# Patient Record
(demographics unavailable — no encounter records)

---

## 2017-12-18 NOTE — RAD
CHEST 1 VIEW:

 

Date:  12/18/17 

 

HISTORY:  

Fever. 

 

COMPARISON:  

None. 

 

FINDINGS:

There are patchy air space opacities in both lower lobes. Heart size is enlarged. No pneumothorax. 

 

No focal osseous abnormality. 

 

IMPRESSION: 

1.  Faint lower lobe air opacities are somewhat streaky and may represent pneumonia in the correct cl
inical setting. Follow-up recommended. 

 

2.  Mild lung hyperinflation suggestive of obstructive pulmonary disease. 

 

 

POS: HOME

## 2017-12-19 NOTE — PDOC.FM
- Subjective


Subjective: 





Pt reports doing a little better this morning. Denies any fever, chills. Denies 

any acute events overnight. Is tolerating PO. Has not gotten up and moved 

around much yet. 





- Objective


MAR Reviewed: Yes


Vital Signs & Weight: 


 Vital Signs (12 hours)











  Temp Pulse Resp BP BP Pulse Ox


 


 12/19/17 08:42  99.0 F  74  18    94 L


 


 12/19/17 08:40  99.0 F  74  18   137/69  96


 


 12/19/17 06:10   66  18  114/53 L   96


 


 12/19/17 05:15   82  20  95/50 L   96


 


 12/19/17 04:01  98.5 F  74  18  104/45 L   95


 


 12/18/17 23:37  97.8 F  65  24 H  112/54 L   91 L








 Weight











Weight                         62.142 kg














I&O: 


 











 12/18/17 12/19/17 12/20/17





 06:59 06:59 06:59


 


Intake Total  1020 400


 


Output Total  50 


 


Balance  970 400











Result Diagrams: 


 12/19/17 03:52





 12/19/17 03:52


Radiology Reviewed by me: Yes


Radiology: 





CXR: 1. faint lower lobe air opacities are somewhat streaky and may represent 

pneumonia in clinical setting. 2. Mild lung hyperinflation suggestive of 

obstructive pulm disease





<Alden Massey - Last Filed: 12/19/17 11:22>





- Objective


Vital Signs & Weight: 


 Vital Signs (12 hours)











  Temp Pulse Resp BP BP Pulse Ox


 


 12/19/17 11:48       92 L


 


 12/19/17 11:32  98.8 F  84  19   101/70  92 L


 


 12/19/17 08:42  99.0 F  74  18    94 L


 


 12/19/17 08:40  99.0 F  74  18   137/69  96


 


 12/19/17 06:10   66  18  114/53 L   96


 


 12/19/17 05:15   82  20  95/50 L   96


 


 12/19/17 04:01  98.5 F  74  18  104/45 L   95








 Weight











Weight                         62.142 kg














I&O: 


 











 12/18/17 12/19/17 12/20/17





 06:59 06:59 06:59


 


Intake Total  1020 400


 


Output Total  50 


 


Balance  970 400











Result Diagrams: 


 12/19/17 03:52





 12/19/17 03:52





<Santiago Martin - Last Filed: 12/19/17 12:48>





Phys Exam





- Physical Examination


HEENT: moist MMs, oral pharynx no lesions


Neck: no nodes, supple, full ROM


prominent crackles noted in LLL. Mild crakles in other lobes


decreased air movement auscultated bilaterally


Cardiovascular: RRR, no significant murmur, no rub


Gastrointestinal: soft, non-tender, no distention, positive bowel sounds


Musculoskeletal: no edema, pulses present


Neurological: non-focal, normal sensation, moves all 4 limbs


Lymphatic: no nodes


Psychiatric: normal affect


Skin: normal turgor


Deviation from normal: Has patch of lichen sclerosis on back. Dry skin. Likely 

from chronic itchin





<Alden Massey - Last Filed: 12/19/17 11:22>





Dx/Plan


(1) Sepsis due to pneumonia


Code(s): J18.9 - PNEUMONIA, UNSPECIFIED ORGANISM; A41.9 - SEPSIS, UNSPECIFIED 

ORGANISM   Status: Resolved   


Plan: 


Had increased hr due to atrial flutter. RR increased initialy. Requiring O2 due 

to desaturation. Source of infection likely pneumonia on CXR. 


Vital have since improved. Lactic acid down from 3.1 to 2. 


Was initially tx with Vancomycin and rocephin. Will continue tx with rocephin. D

/c Vancomycin and will switch to azithromycin. 


Was on IV fluids. Tolerating PO now. Lungs getting crackly. Will hold IV fluids 

for now and encourage PO. Will recheck BMP and CBC tmrw am. 








(2) Community acquired pneumonia


Code(s): J18.9 - PNEUMONIA, UNSPECIFIED ORGANISM   Status: Acute   


Plan: 


Plan as above for sepsis. 


Azithromycin and rocephin for Abx coverage. 


Will continue to wean off oxygen. Does not require at home. 


Blood Cx-NGTD


-Will check Urine Ag for S. pneumo and Legionella 


   Will adjust abx as needed. 


-Walking program ordered for her to help and get up and move around. 








(3) Atrial flutter


Code(s): I48.92 - UNSPECIFIED ATRIAL FLUTTER   Status: Resolved   


Plan: 


-Had short episode of Atrial flutter in ER. Is on metroprolol at home. Had 

missed a few doses of medication at home. Also was septic due to pneumonia 

initially. 


-Atrial flutter has since then resolved after getting fluids and abx. 


-On tele monitoring. Rate and rhythm normal. No new episode since admission. 


-Will continue home metoprolol. 








(4) Hypokalemia


Code(s): E87.6 - HYPOKALEMIA   Status: Acute   


Plan: 


K-3.4 this am. replaced with KCl this morning. will recheck BMP in the am and 

replace as needed. 








(5) HTN (hypertension)


Code(s): I10 - ESSENTIAL (PRIMARY) HYPERTENSION   Status: Acute   


Plan: 


-continue home meds. 








<Alden Massey - Last Filed: 12/19/17 11:22>





Attending Addendum





- Attending Addendum


I personally evaluated the patient and discussed the management with Dr. Massey.


I agree with the History, Examination, Assessment and Plan documented above 

with any addition or exceptions noted below.





Patient admitted due to sepsis 2/2 likely community aquired pneumonia. She has 

productive cough, new oxygen requirement, and possible LLL opacity on XR. She 

has rales on clinical exam. Will discontinue Vanc therapy and add Azithromycin 

for CAP coverage. Will check urine antigens for legionella and strep pneumo. 

Her RHETT is improved this morning. Wean O2 as tolerated. Expect that she needs a 

few more days in hospital. Encourage ambulation. No evidence for fluid overload 

at this time. 





<Santiago Martin - Last Filed: 12/19/17 12:48>

## 2017-12-19 NOTE — HP-2
DATE OF SERVICE:  12/18/2017

 

TIME OF SERVICE:  10:30 p.m.

 

CODE STATUS:  FULL CODE.

 

PRIMARY CARE PHYSICIAN:  Dr. Crow.

 

ATTENDING:  Dr. Pan.

 

RESIDENT:  Acosta Koehler MD

 

CHIEF COMPLAINT:  Cough and fever.

 

HISTORY OF PRESENT ILLNESS:  John Gonzalez is an 84-year-old woman with a past medical history of hyperte
nsion, hyperlipidemia, who presents with a cough since Thursday, approximately 5 days ago.  She has a
lso had some loose stools starting yesterday as well.  The cough has been productive with clear mucus
.  Her appetite has been decreased as well as she has not been eating or drinking much due to dry linda
th and non-comfortable dentures.  Patient denies chest pains, dizziness, dyspnea, nausea, vomiting, o
r abdominal pain.  She was sent to the ED from Urgent Care.  In the ER, she received Zosyn and vancom
ycin as well as two 1 liter boluses of normal saline.

 

PAST MEDICAL HISTORY:

1.  Hypertension.

2.  Hyperlipidemia.

3.  Osteoarthritis.

 

PAST SURGICAL HISTORY:

1.  Appendectomy.

2.  Lumpectomy.

3.  Hysterectomy.

 

ALLERGIES:  PENICILLINS.

 

MEDICATIONS:

1.  Metoprolol tartrate 50 mg p.o. b.i.d.

2.  Amlodipine 2.5 mg p.o. daily.

 

FAMILY HISTORY:  Unremarkable.

 

SOCIAL HISTORY:  Patient denies tobacco, alcohol, or drug use.

 

REVIEW OF SYSTEMS:  Twelve point review of systems including general, eyes, ENT, respiratory, CV, GI,
 , skin, musculoskeletal, neuro, and psych were all negative, unless otherwise stated in the HPI wi
th the exception of incontinence, dysuria, and polyuria and nasal congestion or rhinorrhea.

 

PHYSICAL EXAMINATION:

VITAL SIGNS:  Blood pressure 119/68, pulse 99, respiratory rate 22, her temperature 100.1, pulse ox 9
5% on 2 liters.  Current weight 68 kilograms.

GENERAL:  The patient is alert and oriented x4, in no acute distress.  Well-developed, well-nourished
, appropriately interactive.

EYES:  Pupils equal, round, reactive to light and accommodation.  Extraocular muscles are intact.  Co
njunctivae within normal limits.

ENT:  Tympanic membranes pearly gray without bulging or erythema.  Nasal mucosa and oropharynx within
 normal limits.

NECK:  Supple without lymphadenopathy or thyromegaly.

CARDIOVASCULAR:  The patient is tachycardic at times.  She had irregular rhythm on the monitor.  She 
does not have any murmurs or gallops throughout the exam.  The patient would switch from sinus rhythm
 to what appeared to be atrial flutter and back to sinus rhythm.

RESPIRATORY:  Normal effort. No retractions.

LUNGS:  Clear to auscultation bilaterally.

SKIN:  Warm and dry.  No cyanosis or lesions.

ABDOMEN:  Soft, nontender, bowel sounds x4.  No masses or distention.

EXTREMITIES:  No clubbing, cyanosis, or edema.

MUSCULOSKELETAL:  Structure and tone within normal limits.  Full range of motion.

NEUROLOGIC:  No focal deficits.  Sensation within normal limits.

PSYCHIATRIC:  Appropriate.

 

LABORATORY AND DIAGNOSTIC DATA:  White blood cell count 5.4, hemoglobin 15.0, hematocrit 43.7, platel
ets 153, 45% bands, 31% neutrophils, 12% metamyelocytes.  Sodium 133, potassium 3.4, chloride 97, car
bon dioxide 20, BUN 25, creatinine 1.19, glucose 129, calcium 9.6, total protein 8.0, albumin 4.1, to
carlitos bilirubin 0.8, AST 50, ALT 18, alkaline phosphatase 61, lactic acid 3.4.  UA was significant for 
large blood, 100 protein, trace ketones, 7-10 red blood cells, and 4-6 white blood cells, no bacteria
 seen.  EKG showed atrial flutter with a rate of 160.  Chest x-ray showed faint lower lobe opacity, m
ay represent pneumonia and mild lung hyperinflation suggestive of COPD.

 

ASSESSMENT AND PLAN:  An 84-year-old female with hypertension, hyperlipidemia, and osteoarthritis.

1.  Sepsis secondary to pneumonia versus influenza versus urinary tract infection.  Admit to telemetr
y, check for influenza.  Blood and urine cultures pending.  Start IV antibiotics, vancomycin, and Blake
ephin.  The patient has allergy to PENICILLIN, but has tolerated Rocephin in the past.  Normal white 
blood cell count, but increased bands of 45% and metamyelocytes of 12%.  We will recheck a lactate.

2.  New onset atrial flutter.  Missed home dose of metoprolol and amlodipine today.  Likely secondary
 to that and to acute infection.  We will reevaluate after starting home meds and giving IV fluids.

3.  Hypokalemia, supplement and recheck.

4.  Acute kidney injury versus chronic kidney disease.  Check urine sodium and urine creatinine to ch
saleem for fractional excretion of sodium.

5.  Hypertension.  Start home medications.

6.  Diet:  Heart healthy.

7.  Activity:  Ambulate with assist.

8.  Code status:  FULL CODE.

 

DISPOSITION AND LENGTH OF HOSPITAL STAY:  Two days.

 

Symptomatic medications were provided.

 

History and physical exam as well as management were discussed with Dr. Pan.

## 2017-12-20 NOTE — PDOC.FM
- Subjective


Subjective: 





Pt reports being about the same from yesterday. Denies any acute events 

overnight. States she is tired of coughing. Denies sore throat. Denies fevers 

or chills. Has gotten and walked up some. Pt still requiring O2 at this time. 





- Objective


MAR Reviewed: Yes


Vital Signs & Weight: 


 Vital Signs (12 hours)











  Temp Pulse Resp BP BP Pulse Ox


 


 12/20/17 07:40  98.9 F  86  18  143/62 H   93 L


 


 12/20/17 06:08   80  20  128/85   94 L


 


 12/20/17 05:16       94 L


 


 12/20/17 04:17      143/59 H 


 


 12/20/17 04:00  97.7 F  86  18  160/73 H   94 L


 


 12/20/17 00:00  97.3 F L  113 H  20  117/44 L   93 L








 Weight











Weight                         62.142 kg














I&O: 


 











 12/19/17 12/20/17 12/21/17





 06:59 06:59 06:59


 


Intake Total 1020 2640 


 


Output Total 50  


 


Balance 970 2640 











Result Diagrams: 


 12/20/17 05:18





 12/20/17 05:18


Radiology Reviewed by me: Yes (No new radiographs to review this morning)





<Alden Massey - Last Filed: 12/20/17 08:29>





- Objective


Vital Signs & Weight: 


 Vital Signs (12 hours)











  Temp Pulse Resp BP BP BP Pulse Ox


 


 12/20/17 08:30  98.9 F  95  18  142/77 H    93 L


 


 12/20/17 07:40  98.9 F  86  18   143/62 H   93 L


 


 12/20/17 06:08   80  20   128/85   94 L


 


 12/20/17 05:16        94 L


 


 12/20/17 04:17       143/59 H 


 


 12/20/17 04:00  97.7 F  86  18   160/73 H   94 L


 


 12/20/17 00:00  97.3 F L  113 H  20   117/44 L   93 L








 Weight











Weight                         62.142 kg














I&O: 


 











 12/19/17 12/20/17 12/21/17





 06:59 06:59 06:59


 


Intake Total 1020 2640 


 


Output Total 50  


 


Balance 970 2640 











Result Diagrams: 


 12/20/17 05:18





 12/20/17 05:18





<Santiago Martin - Last Filed: 12/20/17 12:00>





Phys Exam





- Physical Examination


HEENT: moist MMs, oral pharynx no lesions


Neck: no nodes, no JVD, supple, full ROM


Respiratory: wheezing present


crackles on auscultation bilaterally


Cardiovascular: RRR, no significant murmur, no rub


Gastrointestinal: soft, non-tender, no distention, positive bowel sounds


Musculoskeletal: no edema, pulses present


Neurological: non-focal, normal sensation, moves all 4 limbs


Lymphatic: no nodes


Psychiatric: normal affect


Skin: no rash, normal turgor, cap refill <2 seconds





<BrysonAlden - Last Filed: 12/20/17 08:29>





Dx/Plan


(1) Sepsis due to pneumonia


Code(s): J18.9 - PNEUMONIA, UNSPECIFIED ORGANISM; A41.9 - SEPSIS, UNSPECIFIED 

ORGANISM   Status: Resolved   


Plan: 


Had increased hr due to atrial flutter. RR increased initialy. Requiring O2 due 

to desaturation. Source of infection likely pneumonia on CXR. 


Vital have since improved. Lactic acid down from 3.1 to 2. Sepsis resolved at 

this time. 


Was initially tx with Vancomycin and rocephin. Will continue tx with rocephin. D

/c Vancomycin and will switch to azithromycin. 


Was on IV fluids. Tolerating PO now. Lungs still crackly and some expitory 

wheezes noted. No leukocytosis on CMP today. No abnormality noted on BMP. 

continue PO intake 








(2) Community acquired pneumonia


Code(s): J18.9 - PNEUMONIA, UNSPECIFIED ORGANISM   Status: Acute   


Plan: 


Plan as above for sepsis. 


Azithromycin and rocephin for Abx coverage. Can d/c azithromycin at this time 

as Urine Ag s. pneumo +. Source of pnuemonia is covered by rocephin. 


Will continue to wean off oxygen. Does not require at home. 


Will continue IV abx today as still requiring O2, having lots of cough. 

Crackles on auscultation


Blood Cx-NGTD


Urine Ag S. Pnuemo +


Urine Ag Legionalle -


-Walking program ordered for her to help and get up and move around. 








(3) Atrial flutter


Code(s): I48.92 - UNSPECIFIED ATRIAL FLUTTER   Status: Resolved   


Plan: 


-Had short episode of Atrial flutter in ER. Is on metroprolol at home. Had 

missed a few doses of medication at home. Also was septic due to pneumonia 

initially. 


-Atrial flutter has since then resolved after getting fluids and abx. 


-On tele monitoring. Rate and rhythm normal. No new episode since admission. 


-Will continue home metoprolol. 








(4) Hypokalemia


Code(s): E87.6 - HYPOKALEMIA   Status: Resolved   


Plan: 


K-4.0 this am. Will check BMP as needed.








(5) HTN (hypertension)


Code(s): I10 - ESSENTIAL (PRIMARY) HYPERTENSION   Status: Acute   


Plan: 


-continue home meds. 


-Had one elevated BP. Will continue to monitor BP. Will adjust medication as 

needed.








<Alden Massey - Last Filed: 12/20/17 08:29>





Attending Addendum





- Attending Addendum


I personally evaluated the patient and discussed the management with Dr. Massey.


I agree with the History, Examination, Assessment and Plan documented above 

with any addition or exceptions noted below.





Patient with confirmed Strep pneumo community acquired pneumonia. She will be 

continued on IV Rocephin for now. Still has supplemental oxygen requirement 

over her baseline. Continue to wean O2 as tolerated. Her renal function is 

improved, and she has had no abnormal heart rhythms since admission. Will 

transfer to medical floor. Will likely need 1-2 days more in hospital. Adding 

Duoneb to therapy for wheezes today. 








<Santiago Martin - Last Filed: 12/20/17 12:00>

## 2017-12-21 NOTE — PDOC.FM
- Subjective


Subjective: 





Pt reports not resting well overnight. Reports a lot of coughing and chest pain 

associated with cough. Denies sore throat. Says she still feels weak. Did not 

drink fluids or eat well yesterday. Denies any fever or chills. 





- Objective


MAR Reviewed: Yes


Vital Signs & Weight: 


 Vital Signs (12 hours)











  Temp Pulse Resp BP BP Pulse Ox


 


 12/21/17 08:20   91   145/77 H  


 


 12/21/17 07:49  97.4 F L  91  16   145/77 H  92 L


 


 12/21/17 06:02   87  18    95


 


 12/21/17 03:43       95


 


 12/20/17 23:55   81  16    94 L








 Weight











Weight                         62.142 kg














I&O: 


 











 12/20/17 12/21/17 12/22/17





 06:59 06:59 06:59


 


Intake Total 2640 480 


 


Balance 2640 480 











Result Diagrams: 


 12/20/17 05:18





 12/20/17 05:18


Radiology Reviewed by me: Yes (No new imaging to review today)





<Alden Massey - Last Filed: 12/21/17 08:24>





- Objective


Vital Signs & Weight: 


 Vital Signs (12 hours)











  Temp Pulse Resp BP BP BP Pulse Ox


 


 12/21/17 11:39   67  16    119/72  93 L


 


 12/21/17 11:35   67  16     94 L


 


 12/21/17 08:20   91   145/77 H   


 


 12/21/17 08:00  97.4 F L  91  18    


 


 12/21/17 07:49  97.4 F L  91  16   145/77 H   92 L


 


 12/21/17 06:02   87  18     95


 


 12/21/17 03:43        95








 Weight











Weight                         62.142 kg














I&O: 


 











 12/20/17 12/21/17 12/22/17





 06:59 06:59 06:59


 


Intake Total 2640 480 


 


Balance 2640 480 











Result Diagrams: 


 12/20/17 05:18





 12/21/17 08:45





<Santiago Martin - Last Filed: 12/21/17 12:32>





Phys Exam





- Physical Examination


Pt just appears weak and still acutely ill. 


HEENT: moist MMs, oral pharynx no lesions


Neck: no nodes, no JVD, supple, full ROM


Respiratory: wheezing present


Crackles heard in all lobes. 


Cardiovascular: RRR, no significant murmur, no rub


Gastrointestinal: soft, non-tender, no distention, positive bowel sounds


Musculoskeletal: no edema, pulses present


Neurological: non-focal, normal sensation, moves all 4 limbs


Lymphatic: no nodes


Psychiatric: normal affect, A&O x 3


Skin: no rash, normal turgor





<Alden Massey - Last Filed: 12/21/17 08:24>





Dx/Plan


(1) Sepsis due to pneumonia


Code(s): J18.9 - PNEUMONIA, UNSPECIFIED ORGANISM; A41.9 - SEPSIS, UNSPECIFIED 

ORGANISM   Status: Resolved   


Plan: 


Had increased hr due to atrial flutter. RR increased initialy. Requiring O2 due 

to desaturation. Source of infection likely pneumonia on CXR. 


Vital have since improved. Lactic acid down from 3.1 to 2. Sepsis resolved at 

this time. 


Was initially tx with Vancomycin and rocephin. Will continue tx with rocephin. D

/c Vancomycin and will switch to azithromycin. Azithromycin discontinued as 

urine ag was strep pneumo positive. Being covered w/ rocephin


Was on IV fluids. Lungs still crackly and some expitory wheezes noted. No 

leukocytosis noted. Checking BMP today to check for signs of dehydration. Pt 

not drinking or eating much.   








(2) Community acquired pneumonia


Code(s): J18.9 - PNEUMONIA, UNSPECIFIED ORGANISM   Status: Acute   


Plan: 


Plan as above for sepsis. 


Rocephin for Abx coverage.Urine Ag s. pneumo +. Source of pnuemonia is covered 

by rocephin. 


Will continue to wean off oxygen. Does not require at home. 


Will continue IV abx today. O2 sats 95-96% on RA. Lungs still very wheezy and 

crackly. Pt having lots of cough and reports still feeling ill and weak. 


Will add prednisone to help w/ sx's and help open airways. Also CXR showed 

possible underlying obstructive airway disease. 


Blood Cx-NGTD


Urine Ag S. Pnuemo +


Urine Ag Legionalle -


-Walking program ordered for her to help and get up and move around. 








(3) Atrial flutter


Code(s): I48.92 - UNSPECIFIED ATRIAL FLUTTER   Status: Resolved   


Plan: 


-Had short episode of Atrial flutter in ER. Is on metroprolol at home. Had 

missed a few doses of medication at home. Also was septic due to pneumonia 

initially. 


-Atrial flutter has since then resolved after getting fluids and abx. 


Rate and rhythm normal. No new episode since admission. 


-Will continue home metoprolol. 








(4) Hypokalemia


Code(s): E87.6 - HYPOKALEMIA   Status: Resolved   


Plan: 


K-4.0 this am. Checking BMP this morning. Will replace if need be








(5) HTN (hypertension)


Code(s): I10 - ESSENTIAL (PRIMARY) HYPERTENSION   Status: Acute   


Plan: 


-continue home meds. 


-Had one elevated BP. Will continue to monitor BP. Will adjust medication as 

needed.








<Alden Massey - Last Filed: 12/21/17 08:24>





Attending Addendum





- Attending Addendum


I personally evaluated the patient and discussed the management with Dr. Massey.


I agree with the History, Examination, Assessment and Plan documented above 

with any addition or exceptions noted below.





Patient continues on treatment for Strep pneumo pneumonia. She is doing 

somewhat better, able to breathe more comfortably without supplemental therapy. 

Continue abx and encourage ambulation. Anticipate discharge tomorrow if doing 

well. 





<Santiago Martin - Last Filed: 12/21/17 12:32>

## 2017-12-22 NOTE — PDOC.FM
- Subjective


Subjective: 





Pt doing much better this morning. Says she feels like she can go home. Is 

still having a lot of coughing. Denies sore throat. Denies any fevers or chills 

overnight. Denies any nasal congestion. Bryson n/v/d/c. No acute events 

overnight. 





- Objective


Vital Signs & Weight: 


 Vital Signs (12 hours)











  Temp Pulse Resp BP BP Pulse Ox


 


 12/22/17 08:00  97.9 F  101 H  18    90 L


 


 12/22/17 07:55   101 H   165/60 H  


 


 12/22/17 07:44  97.9 F  101 H  24 H   165/60 H 


 


 12/22/17 06:42   85  16    95


 


 12/22/17 02:51       92 L


 


 12/22/17 00:50   104 H  12   








 Weight











Weight                         62.142 kg














I&O: 


 











 12/21/17 12/22/17 12/23/17





 06:59 06:59 06:59


 


Intake Total 480  


 


Balance 480  











Result Diagrams: 


 12/22/17 06:58





 12/21/17 08:45


Radiology Reviewed by me: Yes (No new imaging to review)





<Alden Massey - Last Filed: 12/22/17 08:45>





- Objective


Vital Signs & Weight: 


 Vital Signs (12 hours)











  Temp Pulse Resp BP BP Pulse Ox


 


 12/22/17 11:35  98.1 F  60  20   150/73 H  94 L


 


 12/22/17 08:00  97.9 F  101 H  18    90 L


 


 12/22/17 07:55   101 H   165/60 H  


 


 12/22/17 07:44  97.9 F  101 H  24 H   165/60 H 


 


 12/22/17 06:42   85  16    95


 


 12/22/17 02:51       92 L


 


 12/22/17 00:50   104 H  12   








 Weight











Weight                         62.142 kg














I&O: 


 











 12/21/17 12/22/17 12/23/17





 06:59 06:59 06:59


 


Intake Total 480  180


 


Balance 480  180











Result Diagrams: 


 12/22/17 06:58





 12/22/17 06:57





<Santiago Martin - Last Filed: 12/22/17 12:19>





Phys Exam





- Physical Examination


HEENT: moist MMs, oral pharynx no lesions


Neck: no nodes, no JVD, supple, full ROM


crackles and wheezing noted in lung bases bilaterally


Cardiovascular: RRR, no significant murmur, no rub


Gastrointestinal: soft, non-tender, no distention


Musculoskeletal: no edema, pulses present


Neurological: non-focal, normal sensation, moves all 4 limbs


Lymphatic: no nodes


Psychiatric: normal affect, A&O x 3


Skin: no rash, normal turgor





<Alden Massey - Last Filed: 12/22/17 08:45>





Dx/Plan


(1) Sepsis due to pneumonia


Code(s): J18.9 - PNEUMONIA, UNSPECIFIED ORGANISM; A41.9 - SEPSIS, UNSPECIFIED 

ORGANISM   Status: Resolved   


Plan: 


Had increased hr due to atrial flutter. RR increased initialy. Requiring O2 due 

to desaturation. Source of infection likely pneumonia on CXR. 


Vital have since improved. Lactic acid down from 3.1 to 2. Sepsis resolved at 

this time. 


Was initially tx with Vancomycin and rocephin. Will continue tx with rocephin. D

/c Vancomycin and will switch to azithromycin. Azithromycin discontinued as 

urine ag was strep pneumo positive. Being covered w/ rocephin


Was on IV fluids. Lungs sounds improved from yesterday. Wheezes and crackles 

only noted in lung bases. No leukocytosis noted.   








(2) Community acquired pneumonia


Code(s): J18.9 - PNEUMONIA, UNSPECIFIED ORGANISM   Status: Acute   


Plan: 


Plan as above for sepsis. 


Rocephin for Abx coverage.Urine Ag s. pneumo +. Source of pnuemonia is covered 

by rocephin. 


Will continue to wean off oxygen. Does not require at home. Pt was satting low 

in 89 90% when vitals were checked this morning. May need some O2 support for 

another day. 


Will continue IV abx today. O2 sats 95-96% on RA. Lungs still very wheezy and 

crackly. Pt having lots of cough and reports still feeling ill and weak. 


Will add prednisone to help w/ sx's and help open airways. Also CXR showed 

possible underlying obstructive airway disease. 


Blood Cx-NGTD


Urine Ag S. Pnuemo +


Urine Ag Legionalle -


-Walking program ordered for her to help and get up and move around. 








(3) Atrial flutter


Code(s): I48.92 - UNSPECIFIED ATRIAL FLUTTER   Status: Resolved   


Plan: 


-Had short episode of Atrial flutter in ER. Is on metroprolol at home. Had 

missed a few doses of medication at home. Also was septic due to pneumonia 

initially. 


-Atrial flutter has since then resolved after getting fluids and abx. 


Rate and rhythm normal. No new episode since admission. 


-Will continue home metoprolol. 








(4) Hypokalemia


Code(s): E87.6 - HYPOKALEMIA   Status: Resolved   


Plan: 


K-3.3 yesterday. replaced with potassium. Will replace as needed.








(5) HTN (hypertension)


Code(s): I10 - ESSENTIAL (PRIMARY) HYPERTENSION   Status: Acute   


Plan: 


-continue home meds. 


-BP a little elevated.  Will continue to monitor BP. Will adjust medication as 

needed.








<Alden Massey - Last Filed: 12/22/17 08:45>





Attending Addendum





- Attending Addendum


I personally evaluated the patient and discussed the management with Dr. Massey.


I agree with the History, Examination, Assessment and Plan documented above 

with any addition or exceptions noted below.





Patient reports improvement today and wants to go home. Her O2 sats have been 

ok on room air, 95% at the time of my exam. We will perform walk test to ensure 

she does not require O2, and likely send home later today.








<Santiago Martin - Last Filed: 12/22/17 12:19>

## 2017-12-26 NOTE — DIS-2
DATE OF ADMISSION:  12/18/2017

 

DATE OF DISCHARGE:  12/22/2017

 

ADMITTING ATTENDING:  Samy Pan M.D.

 

DISCHARGE ATTENDING:  Santiago Martin MD

 

RESIDENT:  Alden Massey, PGY-1.

 

PROCEDURES:  No procedures.

 

CONSULTS:  No consults.

 

IMAGING:  On 12/18/2017, chest x-ray showed:

1.  Faint lower lobe air opacities are somewhat streaky and they represent pneumonia in the correct c
linical setting.  Followup recommended.

2.  Mild lung hyperinflation suggestive of obstructive pulmonary disease.

 

PRIMARY DIAGNOSES:

1.  Sepsis due to pneumonia.

2.  Community-acquired pneumonia due to Streptococcus pneumoniae.

3.  Episode of atrial flutter.

4.  Hypokalemia.

5.  Hypertension.

 

DISCHARGE MEDICATIONS:  Omnicef 300 mg p.o. q.12 hours for 9 days, Mucinex 1200 mg p.o. q.12 hours, a
nd prednisone 40 mg p.o. q.a.m. for 3 more days.  Other discharge medications were amlodipine 2.5 mg 
daily, metoprolol 50 mg p.o. b.i.d.

 

HISTORY OF PRESENT ILLNESS AND BRIEF HOSPITAL COURSE:  This is an 84-year-old female who came in, who
 had been having a cough for about 5 days.  She said the cough was productive of clear mucus.  She ha
d not been eating much or drinking much as she had dry mouth and uncomfortable dentures.  She denied 
any chest pain, vomiting, abdominal pain.  She was sent from the urgent care to the emergency departm
ent.  In the emergency department, she received Zosyn and vancomycin, got the chest x-ray which showe
d the above findings of pneumonia.  Her white blood cell count on admission was 5.4.  Her temperature
 was 100.1 and she was requiring oxygen 2 liters, satting 95%.  Her respiratory rate was a little bit
 increased to 22.  At this time, she was admitted for sepsis either secondary to pneumonia or influen
za.  She was admitted to tele.  She was started on IV antibiotics.  She has gotten vancomycin and Zos
yn in the ER, but we switched her to vancomycin and Rocephin.  She has a noted PENICILLIN allergy but
 has used Rocephin in the past.  Even though her white blood cell count was normal, she did have incr
eased bands at 45% and metamyelocytes at 12%.  Her lactic acid was elevated at 3.4 on admission as we
ll.  On recheck, it would trend down to 2.1.  Also, when she came in, she had a little episode of atr
ial flutter.  She had missed her home dose of metoprolol and amlodipine due to the infection.  She wa
s given a bolus of IV fluids and started on antibiotics and the atrial flutter resolved soon afterwar
ds.  She never did have any more episodes during her admission.  Also, on admission, her creatinine w
as found to be a little bit elevated at 1.19.  Again, she was started on the fluids.  Her creatinine 
would then trend down the next day on 12/19/2017 to 0.82 and resolved.  She was a little bit of hypok
alemic 3.4 on the 12/18/2017, 12/19/2017; up to 4.0 on the 12/20/2017, go back down to 3.3 on the 12/
21/2017 and 3.2 on the 12/22/2017.  She still was not having much of an appetite.  We would replace p
otassium as needed while she was here.  During this time, she never did get a white blood cell count.
  She never did spike a fever once she got admitted, but her band neutrophils would go from 45 to 20 
to 24 to 5 eventually and her metamyelocyte trended from 12 to 3 to not reportable over the course of
 the 3 days.  On the second day, when we saw her on 12/19/2017, we decided to switch her from the van
comycin to azithromycin.  We got a flu panel on her, which was negative.  We have got blood cultures,
 urine cultures which did not grow anything while she was here.  We also on that day ordered urine an
tigens for strep pneumo and legionella.  On the next day on 12/20/2017, when it came back, she was po
sitive for strep pneumo urinary antigen; she had a source for pneumonia.  We stopped the azithromycin
 on 12/20/2017 and just continued with Rocephin while she was here until 12/22/2017.  On the first fe
w days, she was here, she was satting in the low 89%-90% when off oxygen, so she would require oxygen
 for the first few days, 2 liters to sat up in the 95%.  She was still pretty weak, not eating very m
uch, just reporting weaness.  We restarted her home metoprolol and amlodipine, and she never did have
 another episode of atrial flutter and we continued to treat her with Rocephin.  On 12/28/2017, when 
I saw her, her lungs were starting to sound a little wheezy, still had some crackles in it.  At this 
time, x-ray mentioned some obstructive pulmonary disease.  I added DuoNebs which seemed to help her b
reathing well, seemed to help her improve, but then on 12/21/2017, she is still kind of reporting claudia
ng weak, kind of being ill, still needing the oxygen.  She is on 2 liters, sat in the low 90-91% and 
so at this time, I added also a little steroid on as well.  On 12/21/2017, the steroids seemed to hel
p a lot.  She was feeling a lot better.  When checking her oxygen, she was weaned off overnight and w
as satting around 92% to 94%.  We are concerned that she might need some oxygen when she got up and m
julien around.  We did a home O2 testing and she only satted down in the 91%, and after walking, it wou
ld get up to 98%.  She is feeling better, eating a little bit better, and at this time was ready to g
o home.  The whole time she was here, she still reported a real bad deep cough, not really productive
, not coughing anything up, and denied any sore throat.  She also reported having a little bit of ricrado
al congestion, so we added on some Mucinex as well while she was here.  So at this time, we discharge
d her home with Omnicef as she reported being allergic to PENICILLIN medication.  We had a positive s
trep pneumo urinary antigen and we had also sent her home with 3 more days of steroid to do a 5-day c
ourse.  We did not think she needed the DuoNebs anymore in her home, did not need home O2, and told h
er to follow up within a week with her primary care.

 

DISPOSITION:  Stable.

 

DISCHARGE INSTRUCTIONS:

1.  Location:  Home.

2.  Activity:  Activity as tolerated.

3.  Diet:  Heart-healthy diet.

4.  Followup:  Will need to follow up with her primary care doctor within a week to 2 weeks for donnie singleton.